# Patient Record
Sex: FEMALE | Race: WHITE | NOT HISPANIC OR LATINO | Employment: OTHER | URBAN - METROPOLITAN AREA
[De-identification: names, ages, dates, MRNs, and addresses within clinical notes are randomized per-mention and may not be internally consistent; named-entity substitution may affect disease eponyms.]

---

## 2017-12-23 PROBLEM — R07.9 CHEST PAIN: Status: ACTIVE | Noted: 2017-12-23

## 2017-12-23 PROBLEM — E11.9 DIABETES MELLITUS: Status: ACTIVE | Noted: 2017-12-23

## 2017-12-23 PROBLEM — I10 HYPERTENSION: Status: ACTIVE | Noted: 2017-12-23

## 2017-12-23 PROBLEM — N19 KIDNEY FAILURE: Status: ACTIVE | Noted: 2017-12-23

## 2018-01-22 PROBLEM — N18.30 STAGE 3 CHRONIC KIDNEY DISEASE: Status: ACTIVE | Noted: 2017-12-23

## 2018-01-22 PROBLEM — R11.10 VOMITING: Status: ACTIVE | Noted: 2018-01-22

## 2018-01-23 PROBLEM — D64.9 NORMOCYTIC ANEMIA: Status: ACTIVE | Noted: 2018-01-23

## 2018-01-23 PROBLEM — N18.4 STAGE 4 CHRONIC KIDNEY DISEASE: Status: ACTIVE | Noted: 2017-12-23

## 2018-01-23 PROBLEM — R19.4 CHANGE IN BOWEL HABITS: Status: ACTIVE | Noted: 2018-01-23

## 2018-01-23 PROBLEM — R82.81 BACTERIURIA WITH PYURIA: Status: ACTIVE | Noted: 2018-01-23

## 2018-01-23 PROBLEM — R82.71 BACTERIURIA WITH PYURIA: Status: ACTIVE | Noted: 2018-01-23

## 2018-01-24 PROBLEM — E87.6 HYPOKALEMIA: Status: ACTIVE | Noted: 2018-01-24

## 2018-01-24 PROBLEM — R80.9 NEPHROTIC RANGE PROTEINURIA: Status: ACTIVE | Noted: 2018-01-24

## 2018-01-26 PROBLEM — K80.20 CHOLELITHIASIS: Status: ACTIVE | Noted: 2018-01-26

## 2018-03-07 PROBLEM — M54.41 ACUTE RIGHT-SIDED LOW BACK PAIN WITH RIGHT-SIDED SCIATICA: Status: ACTIVE | Noted: 2018-03-07

## 2018-06-25 PROBLEM — T14.8XXA ABRASION: Status: ACTIVE | Noted: 2018-06-25

## 2018-06-25 PROBLEM — R11.10 VOMITING: Status: RESOLVED | Noted: 2018-01-22 | Resolved: 2018-06-25

## 2018-06-25 PROBLEM — Z99.2 ESRD (END STAGE RENAL DISEASE) ON DIALYSIS: Status: ACTIVE | Noted: 2017-12-23

## 2018-06-25 PROBLEM — N18.6 ESRD (END STAGE RENAL DISEASE) ON DIALYSIS: Status: ACTIVE | Noted: 2017-12-23

## 2018-10-01 PROBLEM — E87.6 HYPOKALEMIA: Status: RESOLVED | Noted: 2018-01-24 | Resolved: 2018-10-01

## 2018-10-01 PROBLEM — R82.71 BACTERIURIA WITH PYURIA: Status: RESOLVED | Noted: 2018-01-23 | Resolved: 2018-10-01

## 2018-10-01 PROBLEM — R07.9 CHEST PAIN: Status: RESOLVED | Noted: 2017-12-23 | Resolved: 2018-10-01

## 2018-10-01 PROBLEM — R82.81 BACTERIURIA WITH PYURIA: Status: RESOLVED | Noted: 2018-01-23 | Resolved: 2018-10-01

## 2018-10-01 PROBLEM — R80.9 NEPHROTIC RANGE PROTEINURIA: Status: RESOLVED | Noted: 2018-01-24 | Resolved: 2018-10-01

## 2019-09-23 PROBLEM — N18.6 ESRD (END STAGE RENAL DISEASE) ON DIALYSIS: Chronic | Status: ACTIVE | Noted: 2017-12-23

## 2019-09-23 PROBLEM — Z99.2 ESRD (END STAGE RENAL DISEASE) ON DIALYSIS: Chronic | Status: ACTIVE | Noted: 2017-12-23

## 2019-09-23 PROBLEM — I10 ESSENTIAL HYPERTENSION: Chronic | Status: ACTIVE | Noted: 2017-12-23

## 2019-09-23 PROBLEM — Z79.4 TYPE 2 DIABETES MELLITUS WITH DIABETIC NEPHROPATHY, WITH LONG-TERM CURRENT USE OF INSULIN: Status: ACTIVE | Noted: 2017-12-23

## 2019-09-23 PROBLEM — E11.21 TYPE 2 DIABETES MELLITUS WITH DIABETIC NEPHROPATHY, WITH LONG-TERM CURRENT USE OF INSULIN: Status: ACTIVE | Noted: 2017-12-23

## 2019-09-23 PROBLEM — Z79.4 TYPE 2 DIABETES MELLITUS WITH DIABETIC NEPHROPATHY, WITH LONG-TERM CURRENT USE OF INSULIN: Chronic | Status: ACTIVE | Noted: 2017-12-23

## 2019-09-23 PROBLEM — E11.21 TYPE 2 DIABETES MELLITUS WITH DIABETIC NEPHROPATHY, WITH LONG-TERM CURRENT USE OF INSULIN: Chronic | Status: ACTIVE | Noted: 2017-12-23

## 2019-10-21 PROBLEM — E03.8 SUBCLINICAL HYPOTHYROIDISM: Status: ACTIVE | Noted: 2019-10-21

## 2020-01-12 PROBLEM — K80.20 CALCULUS OF GALLBLADDER WITHOUT CHOLECYSTITIS WITHOUT OBSTRUCTION: Chronic | Status: ACTIVE | Noted: 2018-01-26

## 2020-01-12 PROBLEM — T14.8XXA ABRASION: Status: RESOLVED | Noted: 2018-06-25 | Resolved: 2020-01-12

## 2020-01-12 PROBLEM — D64.9 NORMOCYTIC ANEMIA: Chronic | Status: ACTIVE | Noted: 2018-01-23

## 2020-01-12 PROBLEM — E03.8 SUBCLINICAL HYPOTHYROIDISM: Chronic | Status: ACTIVE | Noted: 2019-10-21

## 2020-02-08 PROBLEM — I20.89 ANGINA AT REST: Status: ACTIVE | Noted: 2020-02-08

## 2020-04-21 PROBLEM — U07.1 GASTROENTERITIS DUE TO COVID-19 VIRUS: Status: ACTIVE | Noted: 2020-04-21

## 2020-04-21 PROBLEM — A08.39 GASTROENTERITIS DUE TO COVID-19 VIRUS: Status: ACTIVE | Noted: 2020-04-21

## 2020-04-21 PROBLEM — R62.7 FAILURE TO THRIVE SYNDROME, ADULT: Status: ACTIVE | Noted: 2020-04-21

## 2020-04-21 PROBLEM — I95.1 ORTHOSTATIC HYPOTENSION: Status: ACTIVE | Noted: 2020-04-21

## 2020-04-22 PROBLEM — E44.0 MODERATE MALNUTRITION: Status: ACTIVE | Noted: 2020-04-22

## 2020-04-23 PROBLEM — U07.1 GASTROENTERITIS DUE TO COVID-19 VIRUS: Status: RESOLVED | Noted: 2020-04-21 | Resolved: 2020-04-23

## 2020-04-23 PROBLEM — A08.39 GASTROENTERITIS DUE TO COVID-19 VIRUS: Status: RESOLVED | Noted: 2020-04-21 | Resolved: 2020-04-23

## 2020-04-23 PROBLEM — I95.1 ORTHOSTATIC HYPOTENSION: Status: RESOLVED | Noted: 2020-04-21 | Resolved: 2020-04-23

## 2020-11-08 PROBLEM — R62.7 FAILURE TO THRIVE SYNDROME, ADULT: Status: RESOLVED | Noted: 2020-04-21 | Resolved: 2020-11-08

## 2020-11-08 PROBLEM — E44.0 MODERATE MALNUTRITION: Status: RESOLVED | Noted: 2020-04-22 | Resolved: 2020-11-08

## 2021-03-29 PROBLEM — S13.4XXA WHIPLASH INJURIES, INITIAL ENCOUNTER: Status: ACTIVE | Noted: 2021-03-29

## 2021-03-29 PROBLEM — R41.0 CONFUSION AND DISORIENTATION: Status: ACTIVE | Noted: 2021-03-29

## 2021-04-11 PROBLEM — S13.4XXS WHIPLASH INJURIES, SEQUELA: Chronic | Status: ACTIVE | Noted: 2021-03-29

## 2021-04-12 PROBLEM — R41.3 MEMORY LOSS: Status: ACTIVE | Noted: 2021-04-12

## 2021-05-04 ENCOUNTER — PATIENT MESSAGE (OUTPATIENT)
Dept: RESEARCH | Facility: HOSPITAL | Age: 76
End: 2021-05-04

## 2021-05-10 ENCOUNTER — PATIENT MESSAGE (OUTPATIENT)
Dept: RESEARCH | Facility: HOSPITAL | Age: 76
End: 2021-05-10

## 2021-06-01 PROBLEM — R11.14 BILIOUS VOMITING WITH NAUSEA: Status: ACTIVE | Noted: 2021-06-01

## 2021-06-15 PROBLEM — E11.9 TYPE 2 DIABETES MELLITUS WITHOUT COMPLICATION, WITHOUT LONG-TERM CURRENT USE OF INSULIN: Status: ACTIVE | Noted: 2021-06-15
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
## 2021-10-07 PROBLEM — R55 SYNCOPE: Status: ACTIVE | Noted: 2021-10-07

## 2021-10-08 PROBLEM — R00.1 SINUS BRADYCARDIA: Status: ACTIVE | Noted: 2021-10-08

## 2021-10-10 PROCEDURE — G0180 MD CERTIFICATION HHA PATIENT: HCPCS | Mod: ,,, | Performed by: INTERNAL MEDICINE

## 2021-10-10 PROCEDURE — G0180 PR HOME HEALTH MD CERTIFICATION: ICD-10-PCS | Mod: ,,, | Performed by: INTERNAL MEDICINE

## 2021-10-29 ENCOUNTER — EXTERNAL HOME HEALTH (OUTPATIENT)
Dept: HOME HEALTH SERVICES | Facility: HOSPITAL | Age: 76
End: 2021-10-29
Payer: MEDICARE

## 2021-11-23 ENCOUNTER — OFFICE VISIT (OUTPATIENT)
Dept: NEUROLOGY | Facility: CLINIC | Age: 76
End: 2021-11-23
Payer: MEDICARE

## 2021-11-23 VITALS
BODY MASS INDEX: 24.7 KG/M2 | WEIGHT: 109.81 LBS | RESPIRATION RATE: 18 BRPM | HEIGHT: 56 IN | DIASTOLIC BLOOD PRESSURE: 60 MMHG | HEART RATE: 64 BPM | SYSTOLIC BLOOD PRESSURE: 112 MMHG

## 2021-11-23 DIAGNOSIS — N18.6 ESRD (END STAGE RENAL DISEASE) ON DIALYSIS: Primary | ICD-10-CM

## 2021-11-23 DIAGNOSIS — Z99.2 ESRD (END STAGE RENAL DISEASE) ON DIALYSIS: Primary | ICD-10-CM

## 2021-11-23 DIAGNOSIS — R29.6 FALLS FREQUENTLY: ICD-10-CM

## 2021-11-23 DIAGNOSIS — R41.3 MEMORY LOSS: ICD-10-CM

## 2021-11-23 PROCEDURE — 99999 PR PBB SHADOW E&M-EST. PATIENT-LVL V: CPT | Mod: PBBFAC,,, | Performed by: PSYCHIATRY & NEUROLOGY

## 2021-11-23 PROCEDURE — 99999 PR STA SHADOW: CPT | Mod: PBBFAC,,, | Performed by: PSYCHIATRY & NEUROLOGY

## 2021-11-23 PROCEDURE — 99204 OFFICE O/P NEW MOD 45 MIN: CPT | Mod: S$PBB | Performed by: PSYCHIATRY & NEUROLOGY

## 2021-11-23 PROCEDURE — 99215 OFFICE O/P EST HI 40 MIN: CPT | Mod: PBBFAC | Performed by: PSYCHIATRY & NEUROLOGY

## 2021-11-23 PROCEDURE — 99999 PR PBB SHADOW E&M-EST. PATIENT-LVL V: ICD-10-PCS | Mod: PBBFAC,,, | Performed by: PSYCHIATRY & NEUROLOGY

## 2021-11-23 RX ORDER — DONEPEZIL HYDROCHLORIDE 5 MG/1
5 TABLET, FILM COATED ORAL NIGHTLY
COMMUNITY
Start: 2021-11-05 | End: 2022-01-01

## 2021-11-23 RX ORDER — CINACALCET 30 MG/1
30 TABLET, FILM COATED ORAL
COMMUNITY
End: 2022-01-01

## 2021-11-23 RX ORDER — ERGOCALCIFEROL 1.25 MG/1
50000 CAPSULE ORAL
COMMUNITY
Start: 2021-11-18 | End: 2022-01-01

## 2021-11-23 RX ORDER — OMEGA-3-ACID ETHYL ESTERS 1 G/1
2 CAPSULE, LIQUID FILLED ORAL 2 TIMES DAILY
COMMUNITY
Start: 2021-11-05 | End: 2022-01-01 | Stop reason: SDUPTHER

## 2022-01-01 ENCOUNTER — OFFICE VISIT (OUTPATIENT)
Dept: NEUROLOGY | Facility: CLINIC | Age: 77
End: 2022-01-01
Payer: MEDICARE

## 2022-01-01 ENCOUNTER — TELEPHONE (OUTPATIENT)
Dept: NEUROLOGY | Facility: CLINIC | Age: 77
End: 2022-01-01
Payer: MEDICARE

## 2022-01-01 VITALS
HEART RATE: 68 BPM | SYSTOLIC BLOOD PRESSURE: 152 MMHG | BODY MASS INDEX: 24.06 KG/M2 | HEIGHT: 56 IN | WEIGHT: 106.94 LBS | DIASTOLIC BLOOD PRESSURE: 68 MMHG | RESPIRATION RATE: 14 BRPM

## 2022-01-01 DIAGNOSIS — G30.9 ALZHEIMER DISEASE: Primary | ICD-10-CM

## 2022-01-01 DIAGNOSIS — Z99.2 ESRD (END STAGE RENAL DISEASE) ON DIALYSIS: ICD-10-CM

## 2022-01-01 DIAGNOSIS — F02.80 ALZHEIMER DISEASE: Primary | ICD-10-CM

## 2022-01-01 DIAGNOSIS — N18.6 ESRD (END STAGE RENAL DISEASE) ON DIALYSIS: ICD-10-CM

## 2022-01-01 PROCEDURE — 99999 PR STA SHADOW: ICD-10-PCS | Mod: PBBFAC,,, | Performed by: PSYCHIATRY & NEUROLOGY

## 2022-01-01 PROCEDURE — 99214 OFFICE O/P EST MOD 30 MIN: CPT | Mod: S$PBB | Performed by: PSYCHIATRY & NEUROLOGY

## 2022-01-01 PROCEDURE — 99999 PR PBB SHADOW E&M-EST. PATIENT-LVL V: CPT | Mod: PBBFAC,,, | Performed by: PSYCHIATRY & NEUROLOGY

## 2022-01-01 PROCEDURE — 99215 OFFICE O/P EST HI 40 MIN: CPT | Mod: PBBFAC | Performed by: PSYCHIATRY & NEUROLOGY

## 2022-01-01 PROCEDURE — 99999 PR STA SHADOW: CPT | Mod: PBBFAC,,, | Performed by: PSYCHIATRY & NEUROLOGY

## 2022-01-01 RX ORDER — MEMANTINE HYDROCHLORIDE 5 MG/1
5 TABLET ORAL 2 TIMES DAILY
Qty: 60 TABLET | Refills: 11 | Status: SHIPPED | OUTPATIENT
Start: 2022-01-01 | End: 2023-02-24

## 2022-02-04 NOTE — TELEPHONE ENCOUNTER
I would like to see this patient in closer follow up in the next 2-4 weeks to review her memory testing in person.   She should bring a family member. Please arrange in a 30 minutes slot  Thanks

## 2022-02-14 PROBLEM — N25.81 HYPERPARATHYROIDISM DUE TO END STAGE RENAL DISEASE ON DIALYSIS: Status: ACTIVE | Noted: 2022-01-01

## 2022-02-14 PROBLEM — N18.6 HYPERPARATHYROIDISM DUE TO END STAGE RENAL DISEASE ON DIALYSIS: Status: ACTIVE | Noted: 2022-01-01

## 2022-02-14 PROBLEM — R53.1 WEAKNESS GENERALIZED: Status: ACTIVE | Noted: 2022-01-01

## 2022-02-14 PROBLEM — Z99.2 HYPERPARATHYROIDISM DUE TO END STAGE RENAL DISEASE ON DIALYSIS: Status: ACTIVE | Noted: 2022-01-01

## 2022-02-24 NOTE — PATIENT INSTRUCTIONS
Please find out from your PCP why you are on Keppra and Carbamazepine    Also, see PCP to discuss anxiety treatment as you still have severe anxiety by the memory testing.
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
## 2022-03-27 PROBLEM — R00.1 SINUS BRADYCARDIA: Status: RESOLVED | Noted: 2021-10-08 | Resolved: 2022-01-01

## 2022-03-27 PROBLEM — R11.14 BILIOUS VOMITING WITH NAUSEA: Status: RESOLVED | Noted: 2021-06-01 | Resolved: 2022-01-01

## 2022-03-27 PROBLEM — R55 SYNCOPE: Status: RESOLVED | Noted: 2021-10-07 | Resolved: 2022-01-01

## 2022-03-27 PROBLEM — R19.4 CHANGE IN BOWEL HABITS: Status: RESOLVED | Noted: 2018-01-23 | Resolved: 2022-01-01

## 2022-03-27 PROBLEM — I20.89 ANGINA AT REST: Status: RESOLVED | Noted: 2020-02-08 | Resolved: 2022-01-01

## 2022-03-27 PROBLEM — M54.41 ACUTE RIGHT-SIDED LOW BACK PAIN WITH RIGHT-SIDED SCIATICA: Status: RESOLVED | Noted: 2018-03-07 | Resolved: 2022-01-01

## 2022-03-27 PROBLEM — E11.9 TYPE 2 DIABETES MELLITUS WITHOUT COMPLICATION, WITHOUT LONG-TERM CURRENT USE OF INSULIN: Status: RESOLVED | Noted: 2021-06-15 | Resolved: 2022-01-01

## 2022-03-27 PROBLEM — R41.0 CONFUSION AND DISORIENTATION: Status: RESOLVED | Noted: 2021-03-29 | Resolved: 2022-01-01

## 2022-06-20 PROBLEM — M25.511 ACUTE PAIN OF RIGHT SHOULDER: Status: ACTIVE | Noted: 2022-01-01

## 2022-06-20 PROBLEM — S99.921A INJURY OF RIGHT FOOT: Status: ACTIVE | Noted: 2022-01-01

## 2022-08-04 PROBLEM — M00.9 PYOGENIC ARTHRITIS OF RIGHT SHOULDER REGION: Status: ACTIVE | Noted: 2022-01-01

## 2022-08-04 PROBLEM — K65.9 PERITONITIS: Status: ACTIVE | Noted: 2022-01-01

## 2022-08-04 PROBLEM — E63.9 INADEQUATE DIETARY ENERGY INTAKE: Status: ACTIVE | Noted: 2022-01-01

## 2022-08-06 PROBLEM — E87.1 HYPONATREMIA: Status: ACTIVE | Noted: 2022-01-01

## 2022-08-13 PROBLEM — E61.1 IRON DEFICIENCY: Status: ACTIVE | Noted: 2022-01-01

## 2022-11-11 PROBLEM — N17.9 AKI (ACUTE KIDNEY INJURY): Status: ACTIVE | Noted: 2022-01-01

## 2022-11-11 PROBLEM — E87.6 HYPOKALEMIA: Status: RESOLVED | Noted: 2018-01-24 | Resolved: 2022-01-01

## 2022-11-11 PROBLEM — E63.9 INADEQUATE DIETARY ENERGY INTAKE: Status: RESOLVED | Noted: 2022-01-01 | Resolved: 2022-01-01

## 2022-11-11 PROBLEM — E87.1 HYPONATREMIA: Status: RESOLVED | Noted: 2022-01-01 | Resolved: 2022-01-01

## 2022-11-11 PROBLEM — R07.9 CHEST PAIN: Status: ACTIVE | Noted: 2022-01-01

## 2022-11-11 PROBLEM — S99.921A INJURY OF RIGHT FOOT: Status: RESOLVED | Noted: 2022-01-01 | Resolved: 2022-01-01

## 2022-11-11 PROBLEM — K65.9 PERITONITIS: Status: RESOLVED | Noted: 2022-01-01 | Resolved: 2022-01-01

## 2022-11-12 PROBLEM — E43 SEVERE PROTEIN-CALORIE MALNUTRITION: Status: ACTIVE | Noted: 2020-04-22

## 2022-11-18 PROBLEM — R33.9 URINARY RETENTION WITH INCOMPLETE BLADDER EMPTYING: Status: ACTIVE | Noted: 2022-01-01

## 2022-11-18 PROBLEM — N13.9 OBSTRUCTIVE UROPATHY: Status: ACTIVE | Noted: 2022-01-01

## 2023-02-13 PROBLEM — N17.9 AKI (ACUTE KIDNEY INJURY): Status: RESOLVED | Noted: 2022-01-01 | Resolved: 2023-02-13
